# Patient Record
Sex: MALE | Race: ASIAN | NOT HISPANIC OR LATINO | URBAN - METROPOLITAN AREA
[De-identification: names, ages, dates, MRNs, and addresses within clinical notes are randomized per-mention and may not be internally consistent; named-entity substitution may affect disease eponyms.]

---

## 2022-06-09 ENCOUNTER — APPOINTMENT (OUTPATIENT)
Dept: URBAN - METROPOLITAN AREA CLINIC 237 | Age: 32
Setting detail: DERMATOLOGY
End: 2022-06-09

## 2022-06-09 DIAGNOSIS — L42 PITYRIASIS ROSEA: ICD-10-CM

## 2022-06-09 PROBLEM — L30.9 DERMATITIS, UNSPECIFIED: Status: ACTIVE | Noted: 2022-06-09

## 2022-06-09 PROCEDURE — OTHER PATIENT SPECIFIC COUNSELING: OTHER

## 2022-06-09 PROCEDURE — OTHER COUNSELING: OTHER

## 2022-06-09 PROCEDURE — 99202 OFFICE O/P NEW SF 15 MIN: CPT

## 2022-06-09 PROCEDURE — OTHER KOH PREP: OTHER

## 2022-06-09 PROCEDURE — OTHER COUNSELING: TOPICAL STEROIDS: OTHER

## 2022-06-09 PROCEDURE — OTHER PRESCRIPTION: OTHER

## 2022-06-09 RX ORDER — TRIAMCINOLONE ACETONIDE 1 MG/G
CREAM TOPICAL BID
Qty: 80 | Refills: 0 | Status: ERX | COMMUNITY
Start: 2022-06-09

## 2022-06-09 ASSESSMENT — LOCATION DETAILED DESCRIPTION DERM
LOCATION DETAILED: RIGHT CLAVICULAR SKIN
LOCATION DETAILED: LEFT ANTERIOR SHOULDER

## 2022-06-09 ASSESSMENT — LOCATION ZONE DERM
LOCATION ZONE: ARM
LOCATION ZONE: TRUNK

## 2022-06-09 ASSESSMENT — LOCATION SIMPLE DESCRIPTION DERM
LOCATION SIMPLE: RIGHT CLAVICULAR SKIN
LOCATION SIMPLE: LEFT SHOULDER

## 2022-06-09 NOTE — PROCEDURE: KOH PREP
Detail Level: Detailed
Showing: fungal hyphal elements: negative
Koh Procedure Text (Tissue Harvesting Technique): A 15-blade scalpel was used to scrape the skin. The skin scrapings were placed on a glass slide, covered with a coverslip and a KOH solution was applied.
Cpt Desired: 
Koh Intro Text (From The.....): A KOH prep was ordered and evaluated from the

## 2022-06-09 NOTE — PROCEDURE: PATIENT SPECIFIC COUNSELING
Discussed ddx.  It appears most c/w early [place].  Advised if so, he is likely to get more lesions.  Apply the TMC to any new lesions that occur.  If the original lesion worsens with the use of the medication call the office.  \\Aren discussed remote possibility of secondary syphilis.  He denies any history of genital lesions, and had a full screening for STDs late last year. Discussed ddx.  It appears most c/w early [place].  Advised if so, he is likely to get more lesions.  Apply the TMC to any new lesions that occur.  If the original lesion worsens with the use of the medication call the office.  \\Aren discussed remote possibility of secondary syphilis.  He denies any history of genital lesions, and had a full screening for STDs late last year.